# Patient Record
Sex: MALE | Race: BLACK OR AFRICAN AMERICAN | NOT HISPANIC OR LATINO | Employment: FULL TIME | URBAN - METROPOLITAN AREA
[De-identification: names, ages, dates, MRNs, and addresses within clinical notes are randomized per-mention and may not be internally consistent; named-entity substitution may affect disease eponyms.]

---

## 2019-06-03 ENCOUNTER — HOSPITAL ENCOUNTER (EMERGENCY)
Facility: HOSPITAL | Age: 24
Discharge: ANOTHER HOSPITAL | End: 2019-06-03
Attending: EMERGENCY MEDICINE
Payer: MEDICAID

## 2019-06-03 VITALS
DIASTOLIC BLOOD PRESSURE: 69 MMHG | BODY MASS INDEX: 25.11 KG/M2 | TEMPERATURE: 100 F | SYSTOLIC BLOOD PRESSURE: 136 MMHG | HEART RATE: 84 BPM | RESPIRATION RATE: 18 BRPM | HEIGHT: 67 IN | OXYGEN SATURATION: 99 % | WEIGHT: 160 LBS

## 2019-06-03 DIAGNOSIS — L03.211 FACIAL CELLULITIS: ICD-10-CM

## 2019-06-03 DIAGNOSIS — K08.89 PAIN, DENTAL: ICD-10-CM

## 2019-06-03 DIAGNOSIS — R61 NIGHT SWEATS: ICD-10-CM

## 2019-06-03 DIAGNOSIS — K04.7 DENTAL ABSCESS: Primary | ICD-10-CM

## 2019-06-03 DIAGNOSIS — L53.9 FACIAL ERYTHEMA: ICD-10-CM

## 2019-06-03 LAB
BASOPHILS # BLD AUTO: 0.05 K/UL (ref 0–0.2)
BASOPHILS NFR BLD: 0.3 % (ref 0–1.9)
BUN SERPL-MCNC: 11 MG/DL (ref 6–30)
CHLORIDE SERPL-SCNC: 103 MMOL/L (ref 95–110)
CREAT SERPL-MCNC: 0.9 MG/DL (ref 0.5–1.4)
DIFFERENTIAL METHOD: ABNORMAL
EOSINOPHIL # BLD AUTO: 0.1 K/UL (ref 0–0.5)
EOSINOPHIL NFR BLD: 0.4 % (ref 0–8)
ERYTHROCYTE [DISTWIDTH] IN BLOOD BY AUTOMATED COUNT: 14 % (ref 11.5–14.5)
GLUCOSE SERPL-MCNC: 94 MG/DL (ref 70–110)
HCT VFR BLD AUTO: 41.9 % (ref 40–54)
HCT VFR BLD CALC: 43 %PCV (ref 36–54)
HGB BLD-MCNC: 13.7 G/DL (ref 14–18)
IMM GRANULOCYTES # BLD AUTO: 0.09 K/UL (ref 0–0.04)
IMM GRANULOCYTES NFR BLD AUTO: 0.6 % (ref 0–0.5)
INR PPP: 1 (ref 0.8–1.2)
LYMPHOCYTES # BLD AUTO: 2.8 K/UL (ref 1–4.8)
LYMPHOCYTES NFR BLD: 17.4 % (ref 18–48)
MCH RBC QN AUTO: 23 PG (ref 27–31)
MCHC RBC AUTO-ENTMCNC: 32.7 G/DL (ref 32–36)
MCV RBC AUTO: 70 FL (ref 82–98)
MONOCYTES # BLD AUTO: 1.7 K/UL (ref 0.3–1)
MONOCYTES NFR BLD: 10.4 % (ref 4–15)
NEUTROPHILS # BLD AUTO: 11.3 K/UL (ref 1.8–7.7)
NEUTROPHILS NFR BLD: 70.9 % (ref 38–73)
NRBC BLD-RTO: 0 /100 WBC
PLATELET # BLD AUTO: 336 K/UL (ref 150–350)
PMV BLD AUTO: 11.3 FL (ref 9.2–12.9)
POC IONIZED CALCIUM: 1.14 MMOL/L (ref 1.06–1.42)
POC TCO2 (MEASURED): 27 MMOL/L (ref 23–29)
POTASSIUM BLD-SCNC: 3.9 MMOL/L (ref 3.5–5.1)
PROTHROMBIN TIME: 10.1 SEC (ref 9–12.5)
RBC # BLD AUTO: 5.96 M/UL (ref 4.6–6.2)
SAMPLE: NORMAL
SODIUM BLD-SCNC: 140 MMOL/L (ref 136–145)
WBC # BLD AUTO: 15.88 K/UL (ref 3.9–12.7)

## 2019-06-03 PROCEDURE — 25500020 PHARM REV CODE 255: Performed by: EMERGENCY MEDICINE

## 2019-06-03 PROCEDURE — 99285 EMERGENCY DEPT VISIT HI MDM: CPT | Mod: 25

## 2019-06-03 PROCEDURE — 99285 EMERGENCY DEPT VISIT HI MDM: CPT | Mod: ,,, | Performed by: NURSE PRACTITIONER

## 2019-06-03 PROCEDURE — 25000003 PHARM REV CODE 250: Performed by: NURSE PRACTITIONER

## 2019-06-03 PROCEDURE — 85610 PROTHROMBIN TIME: CPT

## 2019-06-03 PROCEDURE — 99285 PR EMERGENCY DEPT VISIT,LEVEL V: ICD-10-PCS | Mod: ,,, | Performed by: NURSE PRACTITIONER

## 2019-06-03 PROCEDURE — 96365 THER/PROPH/DIAG IV INF INIT: CPT | Mod: 59

## 2019-06-03 PROCEDURE — 85025 COMPLETE CBC W/AUTO DIFF WBC: CPT

## 2019-06-03 PROCEDURE — S0077 INJECTION, CLINDAMYCIN PHOSP: HCPCS | Performed by: NURSE PRACTITIONER

## 2019-06-03 RX ORDER — IBUPROFEN 400 MG/1
400 TABLET ORAL EVERY 4 HOURS
COMMUNITY
End: 2022-10-27

## 2019-06-03 RX ORDER — AMOXICILLIN 500 MG/1
500 CAPSULE ORAL
COMMUNITY
End: 2022-05-25

## 2019-06-03 RX ORDER — HYDROCODONE BITARTRATE AND ACETAMINOPHEN 5; 325 MG/1; MG/1
1 TABLET ORAL
Status: COMPLETED | OUTPATIENT
Start: 2019-06-03 | End: 2019-06-03

## 2019-06-03 RX ORDER — CLINDAMYCIN PHOSPHATE 900 MG/50ML
900 INJECTION, SOLUTION INTRAVENOUS
Status: COMPLETED | OUTPATIENT
Start: 2019-06-03 | End: 2019-06-03

## 2019-06-03 RX ADMIN — CLINDAMYCIN IN 5 PERCENT DEXTROSE 900 MG: 18 INJECTION, SOLUTION INTRAVENOUS at 03:06

## 2019-06-03 RX ADMIN — IOHEXOL 75 ML: 350 INJECTION, SOLUTION INTRAVENOUS at 03:06

## 2019-06-03 RX ADMIN — HYDROCODONE BITARTRATE AND ACETAMINOPHEN 1 TABLET: 5; 325 TABLET ORAL at 04:06

## 2019-06-03 NOTE — ED PROVIDER NOTES
Encounter Date: 6/3/2019    SCRIBE #1 NOTE: I, Alondra Francisco, am scribing for, and in the presence of,  Jeremy Banegas MD. I have scribed the following portions of the note - the APC attestation.       History     Chief Complaint   Patient presents with    Abscess     Swelling to right side of face since Thurs.  Saw dentist and was given antibiotics without relief.     24-year-old male with presents with right-sided facial swelling and worsening dental abscess.  Patient states that the pain and swelling began last Tuesday and worsened on Thursday.  He went to the dentist on Friday and had a cavity filled and given Amoxil along with Motrin.  Patient states the swelling has improved but the pain has worsened and he now has a huge not to his right cheek.  Patient states that he had night sweats last night and felt very fevers but did not check his temperature.    The history is provided by the patient.     Review of patient's allergies indicates:  No Known Allergies  Past Medical History:   Diagnosis Date    Asthma      History reviewed. No pertinent surgical history.  No family history on file.  Social History     Tobacco Use    Smoking status: Never Smoker    Smokeless tobacco: Never Used   Substance Use Topics    Alcohol use: Yes     Frequency: Monthly or less    Drug use: Yes     Types: Marijuana     Review of Systems   Constitutional: Negative for fever.   HENT: Positive for dental problem and facial swelling. Negative for sore throat.    Respiratory: Negative for shortness of breath.    Cardiovascular: Negative for chest pain.   Gastrointestinal: Negative for nausea.   Genitourinary: Negative for dysuria.   Musculoskeletal: Negative for back pain.   Skin: Negative for rash.   Neurological: Negative for weakness.   Hematological: Does not bruise/bleed easily.   All other systems reviewed and are negative.    Physical Exam     Initial Vitals [06/03/19 1259]   BP Pulse Resp Temp SpO2   122/64 92 16 99.3  °F (37.4 °C) 98 %      MAP       --         Physical Exam    Nursing note and vitals reviewed.  Constitutional: He appears well-developed and well-nourished.   HENT:   Head: Normocephalic and atraumatic.       Mouth/Throat:       Moderate amount of facial swelling along with erythema noted; ping-pong sized mass noted to right cheek region consistent with a large abscess; right upper gumline with fluctuance and erythema   Eyes: Conjunctivae and EOM are normal. Pupils are equal, round, and reactive to light.   Cardiovascular: Normal rate, regular rhythm, normal heart sounds and intact distal pulses. Exam reveals no gallop and no friction rub.    No murmur heard.  Pulmonary/Chest: Breath sounds normal. No respiratory distress. He has no wheezes. He has no rhonchi. He has no rales. He exhibits no tenderness.   Musculoskeletal: Normal range of motion. He exhibits no edema or tenderness.   Neurological: He is alert and oriented to person, place, and time. He has normal strength. GCS score is 15. GCS eye subscore is 4. GCS verbal subscore is 5. GCS motor subscore is 6.       ED Course   Procedures  Labs Reviewed   CBC W/ AUTO DIFFERENTIAL - Abnormal; Notable for the following components:       Result Value    WBC 15.88 (*)     Hemoglobin 13.7 (*)     Mean Corpuscular Volume 70 (*)     Mean Corpuscular Hemoglobin 23.0 (*)     Immature Granulocytes 0.6 (*)     Gran # (ANC) 11.3 (*)     Immature Grans (Abs) 0.09 (*)     Mono # 1.7 (*)     Lymph% 17.4 (*)     All other components within normal limits   PROTIME-INR   ISTAT PROCEDURE          Imaging Results          CT Maxillofacial With Contrast (Edited Result - FINAL)  Result time 06/03/19 16:06:53    Addendum 1 of 1 by Jose Travis DO (06/03/19 16:06:53)      Please note there is voice recognition error within the findings 1st    Which should read:    There is induration and swelling about the right zygoma      Electronically signed by: Jose Travis    Date:    06/03/2019  Time:    16:06               Final result by Jose Travis DO (06/03/19 15:29:23)                 Impression:      Diffuse soft tissue swelling induration right pre maxillary soft tissues with focal peripheral enhancing hypodense collection right periorbital ir soft tissues adjacent to the right 1st and 2nd molar teeth most compatible with extension of dental abscess with subtle periapical cyst associated with the molar teeth.    Clinical correlation and correlation with dental examination recommended.      Electronically signed by: Jose Travis DO  Date:    06/03/2019  Time:    15:29             Narrative:    EXAMINATION:  CT MAXILLOFACIAL WITH CONTRAST    CLINICAL HISTORY:  Mass, lump or swelling, maxface;    TECHNIQUE:  Multiple sequential 2.5 mm axial images of the maxillofacial bones with contrast.  Coronal and sagittal reformatted imaging from the axial acquisition.  75 mL of Omnipaque 350 contrast was infused intravenously.    COMPARISON:  None    FINDINGS:  There is diffuse soft tissue swelling induration in the right pre maxillary soft tissues extending from level of the orbit to the maxillary alveolus concerning for cellulitis.  There is induration and swelling about the right sick Violet.    There is a peripheral enhancing hypodense collection within the right periorbital ir soft tissues about the right 1st and 2nd molar teeth with superimposed periapical cysts most compatible with dental abscess this measures 1.9 by 1.2 by 2.1 cm in AP by TV by CC dimensions respectively.  Clinical correlation and correlation with dental examination recommended.    There is mild moderate peripheral opacity inferior right maxillary antra suggestive for mucosal thickening with few patchy right posterior ethmoid air cell opacities.  Few prominent submandibular and submental lymph nodes likely reactive.    There is no evidence for acute fracture of the maxillofacial bones.  No evidence for  mandibular fracture or dislocation.    This report was flagged in Epic as abnormal.                                 Medical Decision Making:   Initial Assessment:   24-year-old male with presents with right-sided facial swelling and worsening dental abscess.  Patient states that the pain and swelling began last Tuesday and worsened on Thursday.  He went to the dentist on Friday and had a cavity filled and given Amoxil along with Motrin.  Patient states the swelling has improved but the pain has worsened and he now has a huge not to his right cheek.  Patient states that he had night sweats last night and felt very fevers but did not check his temperature.    Differential Diagnosis:   Facial edema, facial cellulitis, dental abscess  Clinical Tests:   Lab Tests: Ordered and Reviewed       <> Summary of Lab: Leukocytosis  Radiological Study: Ordered and Reviewed  ED Management:  Patient examined noted to have extensive facial swelling with erythema concerning for cellulitis.  CT confirms large dental abscess with facial cellulitis extending up to the right orbit.  Patient given clindamycin 900 mg IV.  Leukocytosis of 15.8 noted on CBC.  Patient will be transferred to [facility] for OMFS consultation.  Patient advised of all findings and is in agreement with care.  Other:   I have discussed this case with another health care provider.       <> Summary of the Discussion: Dr. Thacker with ENT does not feel that ENT can take care of this patient and advised that the patient be transferred [facility] for OMFS            Scribe Attestation:   Scribe #1: I performed the above scribed service and the documentation accurately describes the services I performed. I attest to the accuracy of the note.    Attending Attestation:     Physician Attestation Statement for NP/PA:   I discussed this assessment and plan of this patient with the NP/PA, but I did not personally examine the patient. The face to face encounter  was performed by the NP/PA.                  ED Course as of Jun 05 1344   Mon Jun 03, 2019   1346 BP: 122/64 [AT]   1346 Temp: 99.3 °F (37.4 °C) [AT]   1346 Temp src: Oral [AT]   1346 Pulse: 92 [AT]   1346 Resp: 16 [AT]   1346 SpO2: 98 % [AT]   1427 POC Creatinine: 0.9 [AT]   1440 WBC(!): 15.88 [AT]   1554 Dr. Thacker suggested that the patient get transferred to [facility] at [facility] secondary to recent dental work and location of abscess    [AT]   1603 Called the transfer center for transfer to [facility] for [facility] services    [AT]   1615 Pt accepted by Dr. Enrique Rehman at [facility]     [AT]      ED Course User Index  [AT] ARMIDA Starr     Clinical Impression:       ICD-10-CM ICD-9-CM   1. Dental abscess K04.7 522.5   2. Facial cellulitis L03.211 682.0   3. Facial erythema L53.9 695.9   4. Pain, dental K08.89 525.9   5. Night sweats R61 780.8         Disposition:   Disposition: Transferred  Condition: Serious                        ARMIDA Starr  06/03/19 1616       Jeremy Banegas MD  06/05/19 1348

## 2019-06-03 NOTE — ED NOTES
Patient has tooth abscess with facial swelling since Tuesday. Patient had pulp infection in March after filling. [facility] prescribed amoxicillin and motrin, which he started on Thursday. Patient states increase in swelling since starting antibiotics.  Patient reports feeling feverish.  Patient has been using cool compresses, states dentist told him no warm compresses.

## 2021-11-30 NOTE — SUBJECTIVE & OBJECTIVE
Medications:  Continuous Infusions:  Scheduled Meds:   clindamycin (CLEOCIN) IVPB  900 mg Intravenous ED 1 Time     PRN Meds:     No current facility-administered medications on file prior to encounter.      Current Outpatient Medications on File Prior to Encounter   Medication Sig    amoxicillin (AMOXIL) 500 MG capsule Take 500 mg by mouth every 8 (eight) hours.    ibuprofen (ADVIL,MOTRIN) 400 MG tablet Take 400 mg by mouth every 4 (four) hours.       Review of patient's allergies indicates:  No Known Allergies    Past Medical History:   Diagnosis Date    Asthma      History reviewed. No pertinent surgical history.  Family History     None        Tobacco Use    Smoking status: Never Smoker    Smokeless tobacco: Never Used   Substance and Sexual Activity    Alcohol use: Yes     Frequency: Monthly or less    Drug use: Yes     Types: Marijuana    Sexual activity: Not on file     Review of Systems  Objective:     Vital Signs (Most Recent):  Temp: 99.5 °F (37.5 °C) (06/03/19 1449)  Pulse: 84 (06/03/19 1449)  Resp: 18 (06/03/19 1449)  BP: 136/69 (06/03/19 1449)  SpO2: 99 % (06/03/19 1449) Vital Signs (24h Range):  Temp:  [99.3 °F (37.4 °C)-99.5 °F (37.5 °C)] 99.5 °F (37.5 °C)  Pulse:  [84-92] 84  Resp:  [16-18] 18  SpO2:  [98 %-99 %] 99 %  BP: (122-136)/(64-69) 136/69     Weight: 72.6 kg (160 lb)  Body mass index is 25.06 kg/m².        Physical Exam    Significant Labs:  {Results:39414}    Significant Diagnostics:  {Imaging Review:88252}  
yes

## 2022-05-10 ENCOUNTER — HOSPITAL ENCOUNTER (EMERGENCY)
Facility: HOSPITAL | Age: 27
Discharge: HOME | End: 2022-05-10
Attending: EMERGENCY MEDICINE
Payer: MEDICAID

## 2022-05-10 VITALS
OXYGEN SATURATION: 99 % | SYSTOLIC BLOOD PRESSURE: 156 MMHG | DIASTOLIC BLOOD PRESSURE: 104 MMHG | TEMPERATURE: 98 F | HEART RATE: 89 BPM | RESPIRATION RATE: 16 BRPM

## 2022-05-10 DIAGNOSIS — K62.89 RECTAL PAIN: Primary | ICD-10-CM

## 2022-05-10 PROCEDURE — 99282 EMERGENCY DEPT VISIT SF MDM: CPT | Mod: ,,, | Performed by: EMERGENCY MEDICINE

## 2022-05-10 PROCEDURE — 99282 EMERGENCY DEPT VISIT SF MDM: CPT

## 2022-05-10 PROCEDURE — 99282 PR EMERGENCY DEPT VISIT,LEVEL II: ICD-10-PCS | Mod: ,,, | Performed by: EMERGENCY MEDICINE

## 2022-05-10 NOTE — Clinical Note
"Vitor"Kelton Gaspar was seen and treated in our emergency department on 5/10/2022.  He may return to work on 05/12/2022.       If you have any questions or concerns, please don't hesitate to call.      Jerry Santos MD"

## 2022-05-11 NOTE — ED PROVIDER NOTES
SCRIBE #1 NOTE: I, Verena Cardenas, am scribing for, and in the presence of,  Dr. Jerry Santos MD. I have scribed the following portions of the note - Other sections scribed: HPI, ROS, PE.       Source of History:  pt    Chief complaint:  Rectal Pain (Pt reports sitting on the concrete for a while the past few days and since has had some 'thumping' pain to his rectum and buttocks)      HPI:  Vitor Gaspar is a 26 y.o. male presenting with rectal discomfort within the last 3 days. 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 He states his pain has gradually worsened as of yesterday. He described his pain as throbbing and stabbing pressure. He reports abnormal gait due to his pain. Denies history of rectal pain.  No trauma or injury.  He has been taking over-the-counter medications without improvement.    ROS: As per HPI and below:    General: No fever.  No chills.  Eyes: No visual changes.  Head: No headache.    Integument: No rashes or lesions.  Chest: No shortness of breath.  Cardiovascular: No chest pain.  Abdomen: No abdominal pain.  No nausea or vomiting.  Musculoskeletal: + rectal pain  Urinary: No abnormal urination.  Neurologic: No focal weakness.  No numbness. + abnormal gait  Hematologic: No easy bruising.  Endocrine: No excessive thirst or urination.      Review of patient's allergies indicates:  No Known Allergies    No current facility-administered medications on file prior to encounter.     Current Outpatient Medications on File Prior to Encounter   Medication Sig Dispense Refill    amoxicillin (AMOXIL) 500 MG capsule Take 500 mg by mouth every 8 (eight) hours.      ibuprofen (ADVIL,MOTRIN) 400 MG tablet Take 400 mg by mouth every 4 (four) hours.         PMH:  As per HPI and below:  Past Medical History:   Diagnosis Date    Asthma      No past surgical history on file.    Social History     Socioeconomic History    Marital  status: Single   Tobacco Use    Smoking status: Never Smoker    Smokeless tobacco: Never Used   Substance and Sexual Activity    Alcohol use: Yes    Drug use: Yes     Types: Marijuana       No family history on file.    Physical Exam:    Vitals:    05/10/22 2037   BP: (!) 156/104   Pulse: 89   Resp: 16   Temp: 98.4 °F (36.9 °C)     Appearance: No acute distress.  Skin: No rashes seen.  Good turgor.  No abrasions.  No ecchymoses.  Eyes: No conjunctival injection. EOMI, PERRL.  ENT: Oropharynx clear.    Chest:  No increased work of breathing, bilateral chest rise.  Cardiovascular: Regular rate and rhythm.  Normal equal bilateral radial pulses.  Abdomen: Soft.  Not distended.  Nontender.  No guarding.  No rebound. No Masses. No external hemorrhoids. No anal fissure. No perirectal abscess.   Musculoskeletal: Good range of motion all joints.  No deformities.  Neck supple, full range of motion, no obvious deformity.  Neurologic: Moves all extremities.  Normal sensation.  No facial droop.  Normal speech.    Mental Status:  Alert and oriented x 3.  Appropriate, conversant.          Laboratory Studies:  Labs Reviewed - No data to display      Imaging Results    None         Medications Given:  Medications - No data to display    Discussed with: pt    MDM:    26 y.o. male with rectal pain for several days.  There is no sent perirectal abscess, anal fissure, thrombosed hemorrhoid, or other emergent cause for patient's pain.  Recommended Sitz baths, continue NSAIDs/Tylenol home.  Advised pt to follow up with PCP or return if concerning symptoms arise. Pt understands and agrees with plan. Will d/c home.          Diagnostic Impression:    1. Rectal pain          Scribe Attestation:  Scribe #1: I performed the above scribed service and the documentation accurately describes the services I performed. I attest to the accuracy of the note.     Jerry Santos MD  05/11/22 0029

## 2022-05-11 NOTE — ED NOTES
Patient identifiers verified and correct for this patient.  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  Patient reports that he is sexually active with male partners, but that he has not had intercourse or anything placed in his rectum for a while.      LOC: The patient is awake, alert and aware of environment with an appropriate affect, the patient is oriented x 4 and speaking appropriately.   APPEARANCE: Patient appears comfortable and in no acute distress, patient is clean and well groomed.  SKIN: The skin is warm and dry, color consistent with ethnicity, patient has normal skin turgor and moist mucus membranes, skin intact, no breakdown or bruising noted, denies any open wounds or sores.   MUSCULOSKELETAL: Patient moving all extremities spontaneously, no swelling noted.  RESPIRATORY: Airway is open and patent, respirations are spontaneous, patient has a normal effort and rate, no accessory muscle use noted, pt denies any SOB other than his normal SOB from asthma, lungs clear.  CARDIAC: Pt denies any chest pain, no edema noted, capillary refill < 3 seconds.   GASTRO: Soft and non tender to palpation, no distention noted, normoactive bowel sounds present in all four quadrants. Pt states bowel movements have been regular.  Denies any straining for bowel movements, states that he has not seen any blood with his stool.   : Pt denies any pain or frequency with urination.  NEURO: Pt opens eyes spontaneously, behavior appropriate to situation, follows commands, purposeful motor response noted.

## 2022-05-13 ENCOUNTER — TELEPHONE (OUTPATIENT)
Dept: SURGERY | Facility: CLINIC | Age: 27
End: 2022-05-13
Payer: MEDICAID

## 2022-05-13 NOTE — TELEPHONE ENCOUNTER
----- Message from Aleyda Adamson sent at 5/13/2022 10:02 AM CDT -----  Regarding: Same Day Appt  Pt called to schedule an appt for a hospital f/u for hemorrhoids. Pt is requesting to be seen today, requesting a call back.      [phone] (home)

## 2022-05-24 ENCOUNTER — TELEPHONE (OUTPATIENT)
Dept: SURGERY | Facility: CLINIC | Age: 27
End: 2022-05-24
Payer: MEDICAID

## 2022-05-25 ENCOUNTER — OFFICE VISIT (OUTPATIENT)
Dept: SURGERY | Facility: CLINIC | Age: 27
End: 2022-05-25
Payer: MEDICAID

## 2022-05-25 VITALS
HEIGHT: 67 IN | WEIGHT: 162.69 LBS | BODY MASS INDEX: 25.53 KG/M2 | SYSTOLIC BLOOD PRESSURE: 152 MMHG | DIASTOLIC BLOOD PRESSURE: 76 MMHG | HEART RATE: 88 BPM

## 2022-05-25 DIAGNOSIS — L02.215 PERINEAL ABSCESS: Primary | ICD-10-CM

## 2022-05-25 PROCEDURE — 99203 OFFICE O/P NEW LOW 30 MIN: CPT | Mod: S$PBB,,, | Performed by: NURSE PRACTITIONER

## 2022-05-25 PROCEDURE — 3078F DIAST BP <80 MM HG: CPT | Mod: CPTII,,, | Performed by: NURSE PRACTITIONER

## 2022-05-25 PROCEDURE — 1159F MED LIST DOCD IN RCRD: CPT | Mod: CPTII,,, | Performed by: NURSE PRACTITIONER

## 2022-05-25 PROCEDURE — 3008F BODY MASS INDEX DOCD: CPT | Mod: CPTII,,, | Performed by: NURSE PRACTITIONER

## 2022-05-25 PROCEDURE — 1160F PR REVIEW ALL MEDS BY PRESCRIBER/CLIN PHARMACIST DOCUMENTED: ICD-10-PCS | Mod: CPTII,,, | Performed by: NURSE PRACTITIONER

## 2022-05-25 PROCEDURE — 1159F PR MEDICATION LIST DOCUMENTED IN MEDICAL RECORD: ICD-10-PCS | Mod: CPTII,,, | Performed by: NURSE PRACTITIONER

## 2022-05-25 PROCEDURE — 3077F PR MOST RECENT SYSTOLIC BLOOD PRESSURE >= 140 MM HG: ICD-10-PCS | Mod: CPTII,,, | Performed by: NURSE PRACTITIONER

## 2022-05-25 PROCEDURE — 3078F PR MOST RECENT DIASTOLIC BLOOD PRESSURE < 80 MM HG: ICD-10-PCS | Mod: CPTII,,, | Performed by: NURSE PRACTITIONER

## 2022-05-25 PROCEDURE — 3077F SYST BP >= 140 MM HG: CPT | Mod: CPTII,,, | Performed by: NURSE PRACTITIONER

## 2022-05-25 PROCEDURE — 99999 PR PBB SHADOW E&M-EST. PATIENT-LVL III: CPT | Mod: PBBFAC,,, | Performed by: NURSE PRACTITIONER

## 2022-05-25 PROCEDURE — 1160F RVW MEDS BY RX/DR IN RCRD: CPT | Mod: CPTII,,, | Performed by: NURSE PRACTITIONER

## 2022-05-25 PROCEDURE — 99203 PR OFFICE/OUTPT VISIT, NEW, LEVL III, 30-44 MIN: ICD-10-PCS | Mod: S$PBB,,, | Performed by: NURSE PRACTITIONER

## 2022-05-25 PROCEDURE — 99999 PR PBB SHADOW E&M-EST. PATIENT-LVL III: ICD-10-PCS | Mod: PBBFAC,,, | Performed by: NURSE PRACTITIONER

## 2022-05-25 PROCEDURE — 3008F PR BODY MASS INDEX (BMI) DOCUMENTED: ICD-10-PCS | Mod: CPTII,,, | Performed by: NURSE PRACTITIONER

## 2022-05-25 PROCEDURE — 99213 OFFICE O/P EST LOW 20 MIN: CPT | Mod: PBBFAC | Performed by: NURSE PRACTITIONER

## 2022-05-25 RX ORDER — AMOXICILLIN AND CLAVULANATE POTASSIUM 875; 125 MG/1; MG/1
1 TABLET, FILM COATED ORAL EVERY 12 HOURS
Qty: 14 TABLET | Refills: 0 | Status: SHIPPED | OUTPATIENT
Start: 2022-05-25 | End: 2022-06-01

## 2022-05-25 NOTE — LETTER
May 25, 2022      [facility] [address]  [address]  [facility] [address]  Phone: [phone]       Patient: Vitor Gaspar   YOB: 1995  Date of Visit: 05/25/2022    To Whom It May Concern:    Sofia Gaspar  was at [facility] on 05/25/2022. The patient may return to work/school on 5/26/22 with no restrictions. If you have any questions or concerns, or if I can be of further assistance, please do not hesitate to contact me.    Sincerely,      Kareen Jones, NP

## 2022-05-25 NOTE — PROGRESS NOTES
"CRS Office Visit History and Physical    Referring Md:   Aaareferral Self  No address on file    SUBJECTIVE:     Chief Complaint: rectal pain    History of Present Illness:  The patient is new patient to this practice.   Course is as follows:  Patient is a 27 y.o. male presents with rectal pain. Seen in ED 5/10 for same. At that time no abscess, fissure, or thrombosed hemorrhoids noted.  Symptoms have been present for 2 weeks. Started after sitting on hard concrete he believes for a long time.  Felt to right of anus.   Has tried otc hemorrhoid cream and warm sitz baths. Feels improved after taking clindamycin his brother had left.  Associated bleeding: no; also denies any drainage of fluids  Previous anorectal procedures: no  denies straining/prolonged time on toilet with bowel movements.  is not currently taking fiber supplement or stool softener. Daily bm soft formed easy to pass.   Blood thinners: No    Last Colonoscopy: none  Family history of colorectal cancer or IBD: none.    Review of patient's allergies indicates:  No Known Allergies    Past Medical History:   Diagnosis Date    Asthma      No past surgical history on file.  No family history on file.  Social History     Tobacco Use    Smoking status: Never Smoker    Smokeless tobacco: Never Used   Substance Use Topics    Alcohol use: Yes    Drug use: Yes     Types: Marijuana        Review of Systems:  Review of Systems   Gastrointestinal: Negative for blood in stool, constipation and diarrhea.        Anal pain R of anus       OBJECTIVE:     Vital Signs (Most Recent)  Blood Pressure (Abnormal) 152/76 (BP Location: Right arm, Patient Position: Sitting, BP Method: Large (Automatic))   Pulse 88   Height 5' 7" (1.702 m)   Weight 73.8 kg (162 lb 11.2 oz)   Body Mass Index 25.48 kg/m²     Physical Exam:  General: Black or  male in no distress   Neuro: Alert and oriented to person, place, and time.  Moves all extremities.     HEENT: No " icterus.  Trachea midline  Respiratory: Respirations are even and unlabored, no cough or audible wheezing  Skin: Warm dry and intact, No visible rashes, no jaundice    Labs reviewed today:  Lab Results   Component Value Date    WBC 15.88 (H) 06/03/2019    HGB 13.7 (L) 06/03/2019    HCT 43 06/03/2019     06/03/2019    INR 1.0 06/03/2019       Imaging reviewed today:  none    Endoscopy reviewed today:  none    Anorectal Exam:    Anal Skin: right anterior lateral area of induration; no fluctuance noted. minimally ttp.     Digital Rectal Exam:  Resting Tone normal  Squeeze normal  Relaxation with bear down present  Mass none  Rectocele  absent  Tenderness  absent      ASSESSMENT/PLAN:     Diagnoses and all orders for this visit:    Perineal abscess  -     amoxicillin-clavulanate 875-125mg (AUGMENTIN) 875-125 mg per tablet; Take 1 tablet by mouth every 12 (twelve) hours. for 7 days        The patient was instructed to:  Antibiotics 2x/day for 1 week  Continue warm soaks  Follow up in 1 week.    RAMIDA Mathews-BRO  Colon and Rectal Surgery

## 2022-06-01 ENCOUNTER — TELEPHONE (OUTPATIENT)
Dept: SURGERY | Facility: CLINIC | Age: 27
End: 2022-06-01
Payer: MEDICAID

## 2022-10-27 ENCOUNTER — HOSPITAL ENCOUNTER (EMERGENCY)
Facility: HOSPITAL | Age: 27
Discharge: HOME | End: 2022-10-27
Attending: STUDENT IN AN ORGANIZED HEALTH CARE EDUCATION/TRAINING PROGRAM
Payer: MEDICAID

## 2022-10-27 VITALS
WEIGHT: 173 LBS | DIASTOLIC BLOOD PRESSURE: 68 MMHG | SYSTOLIC BLOOD PRESSURE: 124 MMHG | HEART RATE: 82 BPM | BODY MASS INDEX: 26.22 KG/M2 | TEMPERATURE: 99 F | HEIGHT: 68 IN | OXYGEN SATURATION: 99 % | RESPIRATION RATE: 18 BRPM

## 2022-10-27 DIAGNOSIS — S01.311A LACERATION OF RIGHT EAR, INITIAL ENCOUNTER: Primary | ICD-10-CM

## 2022-10-27 PROCEDURE — 12011 RPR F/E/E/N/L/M 2.5 CM/<: CPT

## 2022-10-27 PROCEDURE — 25000003 PHARM REV CODE 250: Performed by: PHYSICIAN ASSISTANT

## 2022-10-27 PROCEDURE — 99284 EMERGENCY DEPT VISIT MOD MDM: CPT | Mod: 25

## 2022-10-27 RX ORDER — OXYCODONE AND ACETAMINOPHEN 5; 325 MG/1; MG/1
1 TABLET ORAL
Status: COMPLETED | OUTPATIENT
Start: 2022-10-27 | End: 2022-10-27

## 2022-10-27 RX ORDER — ACETAMINOPHEN 500 MG
500 TABLET ORAL EVERY 4 HOURS PRN
Qty: 20 TABLET | Refills: 0 | Status: SHIPPED | OUTPATIENT
Start: 2022-10-27 | End: 2022-11-01

## 2022-10-27 RX ORDER — IBUPROFEN 600 MG/1
600 TABLET ORAL EVERY 6 HOURS PRN
Qty: 20 TABLET | Refills: 0 | Status: SHIPPED | OUTPATIENT
Start: 2022-10-27 | End: 2022-11-01

## 2022-10-27 RX ORDER — ALBUTEROL SULFATE 5 MG/ML
2.5 SOLUTION RESPIRATORY (INHALATION) EVERY 6 HOURS PRN
COMMUNITY
End: 2022-10-27 | Stop reason: CLARIF

## 2022-10-27 RX ORDER — LIDOCAINE HYDROCHLORIDE 10 MG/ML
10 INJECTION INFILTRATION; PERINEURAL
Status: COMPLETED | OUTPATIENT
Start: 2022-10-27 | End: 2022-10-27

## 2022-10-27 RX ORDER — CEPHALEXIN 500 MG/1
500 CAPSULE ORAL EVERY 6 HOURS
Qty: 20 CAPSULE | Refills: 0 | Status: SHIPPED | OUTPATIENT
Start: 2022-10-27 | End: 2022-11-01

## 2022-10-27 RX ADMIN — LIDOCAINE HYDROCHLORIDE 10 ML: 10 INJECTION, SOLUTION INFILTRATION; PERINEURAL at 02:10

## 2022-10-27 RX ADMIN — OXYCODONE AND ACETAMINOPHEN 1 TABLET: 5; 325 TABLET ORAL at 02:10

## 2022-10-27 NOTE — ED PROVIDER NOTES
Encounter Date: 10/27/2022       History     Chief Complaint   Patient presents with    Ear Injury     PT was playing with his dog and his right earring was ripped out of his ear causing a tear.     CC: Ear Injury    HPI:   28 y/o M with history of asthma presenting for evaluation of laceration to the right ear lobe that occurred prior to arrival when the patient's dog accidentally ripped earring out. Associated 5/10 pain. No other injuries.  Denies fever, chest pain, shortness breath, dizziness lightheadedness or other associated symptoms.  Tetanus is up-to-date    The history is provided by the patient and a relative.   Review of patient's allergies indicates:  No Known Allergies  Past Medical History:   Diagnosis Date    Asthma      History reviewed. No pertinent surgical history.  History reviewed. No pertinent family history.  Social History     Tobacco Use    Smoking status: Never    Smokeless tobacco: Never   Substance Use Topics    Alcohol use: Yes    Drug use: Yes     Types: Marijuana     Review of Systems   Constitutional:  Negative for chills and fever.   HENT:  Negative for congestion, ear pain, rhinorrhea and sore throat.    Eyes:  Negative for redness.   Respiratory:  Negative for shortness of breath and stridor.    Cardiovascular:  Negative for chest pain.   Gastrointestinal:  Negative for abdominal pain, constipation, diarrhea, nausea and vomiting.   Genitourinary:  Negative for dysuria, frequency, hematuria and urgency.   Musculoskeletal:  Negative for back pain and neck pain.   Skin:  Positive for wound. Negative for rash.   Neurological:  Negative for dizziness, speech difficulty, weakness, light-headedness and numbness.   Hematological:  Does not bruise/bleed easily.   Psychiatric/Behavioral:  Negative for confusion.      Physical Exam     Initial Vitals [10/27/22 0016]   BP Pulse Resp Temp SpO2   127/70 87 16 98.7 °F (37.1 °C) 98 %      MAP       --         Physical Exam    Nursing note and  vitals reviewed.  Constitutional: He appears well-developed and well-nourished. No distress.   HENT:   Head: Normocephalic.   Right Ear: External ear normal.   Left Ear: External ear normal.   Eyes: Conjunctivae are normal.   Cardiovascular:  Normal rate and regular rhythm.     Exam reveals no gallop and no friction rub.       No murmur heard.  Pulmonary/Chest: Breath sounds normal. No respiratory distress. He has no wheezes. He has no rhonchi. He has no rales.   Abdominal: Abdomen is soft. He exhibits no distension. There is no abdominal tenderness. There is no rebound and no guarding.   Musculoskeletal:         General: Normal range of motion.     Neurological: He is alert.   Skin: Skin is warm and dry. No rash noted.   1 cm earlobe laceration with no active bleeding     Psychiatric: He has a normal mood and affect. His behavior is normal. Judgment and thought content normal.       ED Course   Lac Repair    Date/Time: 10/27/2022 3:31 AM  Performed by: Devorah Anand PA-C  Authorized by: Raudel Hernandez MD     Consent:     Consent obtained:  Verbal    Consent given by:  Patient    Risks discussed:  Poor cosmetic result and pain    Alternatives discussed:  No treatment  Universal protocol:     Patient identity confirmed:  Verbally with patient  Anesthesia:     Anesthesia method:  Nerve block    Block location:  R ear block    Block needle gauge:  27 G    Block anesthetic:  Lidocaine 1% w/o epi    Block technique:  Auricular block    Block injection procedure:  Anatomic landmarks identified, introduced needle and anatomic landmarks palpated    Block outcome:  Anesthesia achieved  Laceration details:     Location:  Ear    Ear location:  R ear    Length (cm):  1  Pre-procedure details:     Preparation:  Patient was prepped and draped in usual sterile fashion  Exploration:     Hemostasis achieved with:  Direct pressure  Treatment:     Area cleansed with:  Saline    Amount of cleaning:  Standard    Irrigation  solution:  Sterile saline    Irrigation volume:  50 ml  Skin repair:     Repair method:  Sutures    Suture size:  4-0    Suture material:  Nylon    Suture technique:  Simple interrupted    Number of sutures:  9  Approximation:     Approximation:  Close  Repair type:     Repair type:  Simple  Post-procedure details:     Procedure completion:  Tolerated well, no immediate complications  Labs Reviewed - No data to display       Imaging Results    None          Medications   LIDOcaine HCL 10 mg/ml (1%) injection 10 mL (10 mLs Infiltration Given by Provider 10/27/22 0230)   oxyCODONE-acetaminophen 5-325 mg per tablet 1 tablet (1 tablet Oral Given 10/27/22 0238)     Medical Decision Making:   ED Management:  Laceration repair per procedure note.  No complications.  Will cover with Keflex to prevent infection.  Follow up with primary care for suture removal.                        Clinical Impression:   Final diagnoses:  [S01.311A] Laceration of right ear, initial encounter (Primary)        ED Disposition Condition    Discharge Stable          ED Prescriptions       Medication Sig Dispense Start Date End Date Auth. Provider    ibuprofen (ADVIL,MOTRIN) 600 MG tablet Take 1 tablet (600 mg total) by mouth every 6 (six) hours as needed for Pain. 20 tablet 10/27/2022 11/1/2022 Devorah Anand PA-C    acetaminophen (TYLENOL) 500 MG tablet Take 1 tablet (500 mg total) by mouth every 4 (four) hours as needed. 20 tablet 10/27/2022 11/1/2022 Devorah Anand PA-C    cephALEXin (KEFLEX) 500 MG capsule Take 1 capsule (500 mg total) by mouth every 6 (six) hours. for 5 days 20 capsule 10/27/2022 11/1/2022 Devorah Anand PA-C          Follow-up Information       Follow up With Specialties Details Why Contact Info    Your Primary Care Doctor  Schedule an appointment as soon as possible for a visit in 1 week for follow up, For suture removal     [facility] Emergency Medicine Go to  As needed, If  symptoms worsen [address]  [facility] [id number] [phone]             Devorah Anand PA-C  10/27/22 0422       Devorah Anand PA-C  10/27/22 0423

## 2022-10-27 NOTE — DISCHARGE INSTRUCTIONS

## 2022-10-27 NOTE — Clinical Note
"Vitor"Kelton Gaspar was seen and treated in our emergency department on 10/27/2022.  He may return to work on 10/28/2022.       If you have any questions or concerns, please don't hesitate to call.      Devorah Anand PA-C"